# Patient Record
Sex: FEMALE | Race: WHITE
[De-identification: names, ages, dates, MRNs, and addresses within clinical notes are randomized per-mention and may not be internally consistent; named-entity substitution may affect disease eponyms.]

---

## 2020-06-12 ENCOUNTER — HOSPITAL ENCOUNTER (OUTPATIENT)
Dept: HOSPITAL 53 - M LAB | Age: 65
End: 2020-06-12
Attending: ORTHOPAEDIC SURGERY
Payer: COMMERCIAL

## 2020-06-12 ENCOUNTER — HOSPITAL ENCOUNTER (OUTPATIENT)
Dept: HOSPITAL 53 - M RAD | Age: 65
End: 2020-06-12
Attending: CHIROPRACTOR
Payer: COMMERCIAL

## 2020-06-12 DIAGNOSIS — M54.2: Primary | ICD-10-CM

## 2020-06-12 DIAGNOSIS — M17.12: Primary | ICD-10-CM

## 2020-06-12 LAB
ALBUMIN SERPL BCG-MCNC: 3.9 GM/DL (ref 3.2–5.2)
ALT SERPL W P-5'-P-CCNC: 25 U/L (ref 12–78)
BILIRUB SERPL-MCNC: 0.5 MG/DL (ref 0.2–1)
BUN SERPL-MCNC: 20 MG/DL (ref 7–18)
CALCIUM SERPL-MCNC: 9 MG/DL (ref 8.8–10.2)
CHLORIDE SERPL-SCNC: 104 MEQ/L (ref 98–107)
CO2 SERPL-SCNC: 29 MEQ/L (ref 21–32)
CREAT SERPL-MCNC: 1.08 MG/DL (ref 0.55–1.3)
ERYTHROCYTE [SEDIMENTATION RATE] IN BLOOD BY WESTERGREN METHOD: 37 MM/HR (ref 0–30)
GFR SERPL CREATININE-BSD FRML MDRD: 53.9 ML/MIN/{1.73_M2} (ref 45–?)
GLUCOSE SERPL-MCNC: 100 MG/DL (ref 70–100)
HCT VFR BLD AUTO: 40 % (ref 36–47)
HGB BLD-MCNC: 13 G/DL (ref 12–15.5)
INR PPP: 1.09
MCH RBC QN AUTO: 30.4 PG (ref 27–33)
MCHC RBC AUTO-ENTMCNC: 32.5 G/DL (ref 32–36.5)
MCV RBC AUTO: 93.5 FL (ref 80–96)
PLATELET # BLD AUTO: 390 10^3/UL (ref 150–450)
POTASSIUM SERPL-SCNC: 3.7 MEQ/L (ref 3.5–5.1)
PROT SERPL-MCNC: 7.5 GM/DL (ref 6.4–8.2)
PROTHROMBIN TIME: 13.8 SECONDS (ref 11.8–14)
RBC # BLD AUTO: 4.28 10^6/UL (ref 4–5.4)
SODIUM SERPL-SCNC: 139 MEQ/L (ref 136–145)
WBC # BLD AUTO: 6.3 10^3/UL (ref 4–10)

## 2020-06-12 NOTE — ECGEPIP
[facility]

                                       

                                       Test Date:    2020

Pat Name:     ILAN WALDEN            Department:   

Patient ID:   [id number]                 Room:         -

Gender:       Female                   Technician:   BEN

:          1955               Requested By: LISA Skinner

Order Number: [id number]     Reading MD:   Demetrius Negron

                                 Measurements

Intervals                              Axis          

Rate:         75                       P:            54

TX:           167                      QRS:          -16

QRSD:         88                       T:            48

QT:           410                                    

QTc:          460                                    

                           Interpretive Statements

SINUS RHYTHM

Low QRS complex voltage in the limb leads

POSSIBLE INFERIOR MYOCARDIAL INFARCTION, PROBABLY OLD

Nonspecific ST-T wave abnormalities

Comparison tracing not on file

Electronically Signed on 2020 16:18:16 EDT by Demetrius Negron

## 2020-06-12 NOTE — REP
TWO-VIEW CHEST:

 

REASON FOR EXAM:  Preop.

 

COMPARISON:  Portable exam of 08/18/2014, which is the only prior.

 

FINDINGS:  The superior mediastinal structures are midline.  The cardiac

silhouette is unremarkable in size, shape, and position.  The diaphragmatic

surfaces of the lungs are regular, and the costophrenic angles are clear.  The

pulmonary fields are clear.  The imaged osseous structures are intact.

 

IMPRESSION:

 

There is no acute cardiopulmonary disease. No significant change from the prior

exam.

 

 

Electronically Signed by

Daniele Vanegas DO 06/12/2020 03:05 P

## 2020-06-12 NOTE — HPE
DATE OF ANTICIPATED ADMISSION:  06/17/2020

 

ATTENDING PHYSICIAN:  Dr. Mcginnis

 

CHIEF COMPLAINT:  Left knee pain and stiffness.

 

HISTORY:

The patient is a 65-year-old female with progressively worsening left knee pain

and stiffness.  She has failed to improve with conservative measures.  She

continues to have symptoms with weightbearing activities and activities of daily

living.  She consented for an elective left total knee arthroplasty with Dr. Mcginnis for her continued symptoms.  Medical optimization is pending with 
her

primary care manager Dr. Quintana.

 

CURRENT MEDICATIONS:

- aspirin 81 mg daily

- potassium 20 mEq daily

- magnesium 250 mg daily

- cinnamon 2000 mg twice daily

- Prilosec 20 mg twice daily

- pravastatin 40 mg daily

- venlafaxine 75 mg twice daily

- hydrochlorothiazide 50 mg daily

- amlodipine 5 mg daily

- lisinopril 5 mg daily

- meloxicam 15 mg daily

- acyclovir 400 mg twice daily

 

ALLERGIES:

TALWIN, STADOL, CLINDAMYCIN, LEVAQUIN, CODEINE, TAVIST, MINOCIN, DEMEROL.

 

CHRONIC MEDICAL CONSITIONS:

Gastroesophageal reflux disease.

Hypertension.

Hyperlipidemia.

Osteoarthritis.

Post menopausal symptoms.

 

PAST SURGICAL HISTORY:

Total hysterectomy.

Cholecystectomy.

Cystocele repair.

Urethra sling.

Bilateral tubal ligation,

 

SOCIAL HISTORY:

The patient denies smoking and rarely consumes alcohol.

 

REVIEW OF SYSTEMS:

The patient denies fevers, chills, nausea, vomiting, or diarrhea.  She denies

chest pain, shortness of breath, lightheadedness, dizziness, or headaches.  She

denies any recent upper respiratory or urinary tract infection symptoms.  She

denies any abdominal pain.  She does continue to have left knee pain with

weightbearing activities and activities of daily living.

 

PHYSICAL EXAMINATION:

GENERAL:  Well-nourished, well-developed female in no apparent distress.  She is

alert, oriented and cooperative.  Mood and affect are appropriate.

VITAL SIGNS:  Blood pressure 132/68.  Heart rate 63.  Respirations 14.  Height 
64

inches.  Weight 176.  Temperature 96.8.

HEART:  Regular rate and rhythm.

LUNGS:  Breathing is regular and nonlabored.  Lungs are clear to auscultation

bilaterally.

ABDOMEN:  Soft.  Bowel sounds are present.  No tenderness to palpation.

MUSCULOSKELETAL:  Left knee reveals no gross abnormalities.  There is no

erythema, edema, or ecchymosis.  Skin is intact.  She does have tenderness along

the medial joint line.  The patient can extend knee to 2 degrees and flex to 110

degrees.  Left lower extremity strength is 5/5.  No hip irritability elicited

with range of motion testing.  The patient's calf is soft, nontender to 
palpation

with no palpable cords noted.  She is neurovascularly intact distally.

 

IMPRESSION:

Left knee degenerative arthritis with x-rays notable for end-stage degenerative

changes.

 

PLAN:

The patient has consented for a left total knee arthroplasty with Dr. Mcginnis

for her continued symptoms.  Medical optimization pending with Dr. Quintana.  The

patient will  her Hibiclens wash and Bactroban and start using those

today.  She will discontinue her meloxicam 3 days prior to surgery.

MTDD

## 2020-06-12 NOTE — REP
REASON:  Severe neck pain.

 

There are no priors for comparison.

 

I have been given no history of trauma whatsoever.

 

There is advanced disc space narrowing at every level, particularly C4-5, C5-6,

and C6-7, where heavy anterior and posterior osteophytic ridging is present.

There is limitation of flexion and extension radiographically. Hypertrophic

degenerative facet and uncovertebral joint changes are present at every level

bilaterally. The facet joints appear to be well aligned bilaterally.

 

On the left, there is foraminal narrowing C4-5 through C6-7 inclusive, and on the

right there is foraminal narrowing C3-4 through C7-T1 inclusive.

 

The dens cannot be effectively evaluated secondary to the superimposition of

osseous structures and/or dentition on all views.

 

Marked degenerative changes, as described above. Consider further evaluation with

MRI.

 

Although this plain radiographic evaluation of the cervical spine shows no

evidence of a fracture, it should be remembered that CT is much more sensitive

than plain radiography of the C-spine in detecting fractures.  If this

examination was ordered to rule out a fracture, then CT of the cervical spine is

recommended.

 

 

Electronically Signed by

Daniele Vanegas DO 06/12/2020 03:06 P

## 2020-06-14 ENCOUNTER — HOSPITAL ENCOUNTER (OUTPATIENT)
Dept: HOSPITAL 53 - M LABSMTC | Age: 65
End: 2020-06-14
Attending: ANESTHESIOLOGY
Payer: COMMERCIAL

## 2020-06-14 DIAGNOSIS — Z03.818: Primary | ICD-10-CM

## 2020-06-14 DIAGNOSIS — Z11.59: ICD-10-CM

## 2020-06-17 ENCOUNTER — HOSPITAL ENCOUNTER (INPATIENT)
Dept: HOSPITAL 53 - M OR | Age: 65
LOS: 1 days | Discharge: HOME | DRG: 302 | End: 2020-06-18
Attending: ORTHOPAEDIC SURGERY | Admitting: ORTHOPAEDIC SURGERY
Payer: COMMERCIAL

## 2020-06-17 VITALS — SYSTOLIC BLOOD PRESSURE: 126 MMHG | DIASTOLIC BLOOD PRESSURE: 72 MMHG

## 2020-06-17 VITALS — DIASTOLIC BLOOD PRESSURE: 69 MMHG | SYSTOLIC BLOOD PRESSURE: 123 MMHG

## 2020-06-17 VITALS — BODY MASS INDEX: 30.65 KG/M2 | HEIGHT: 63 IN | WEIGHT: 173 LBS

## 2020-06-17 VITALS — SYSTOLIC BLOOD PRESSURE: 119 MMHG | DIASTOLIC BLOOD PRESSURE: 54 MMHG

## 2020-06-17 VITALS — SYSTOLIC BLOOD PRESSURE: 93 MMHG | DIASTOLIC BLOOD PRESSURE: 52 MMHG

## 2020-06-17 VITALS — SYSTOLIC BLOOD PRESSURE: 119 MMHG | DIASTOLIC BLOOD PRESSURE: 68 MMHG

## 2020-06-17 VITALS — SYSTOLIC BLOOD PRESSURE: 123 MMHG | DIASTOLIC BLOOD PRESSURE: 67 MMHG

## 2020-06-17 DIAGNOSIS — Z88.5: ICD-10-CM

## 2020-06-17 DIAGNOSIS — K21.9: ICD-10-CM

## 2020-06-17 DIAGNOSIS — I10: ICD-10-CM

## 2020-06-17 DIAGNOSIS — Z79.82: ICD-10-CM

## 2020-06-17 DIAGNOSIS — Z79.1: ICD-10-CM

## 2020-06-17 DIAGNOSIS — Z79.899: ICD-10-CM

## 2020-06-17 DIAGNOSIS — M17.12: Primary | ICD-10-CM

## 2020-06-17 DIAGNOSIS — Z88.8: ICD-10-CM

## 2020-06-17 DIAGNOSIS — Z90.710: ICD-10-CM

## 2020-06-17 DIAGNOSIS — Z90.49: ICD-10-CM

## 2020-06-17 DIAGNOSIS — Z88.1: ICD-10-CM

## 2020-06-17 DIAGNOSIS — E78.5: ICD-10-CM

## 2020-06-17 PROCEDURE — 0SRD0J9 REPLACEMENT OF LEFT KNEE JOINT WITH SYNTHETIC SUBSTITUTE, CEMENTED, OPEN APPROACH: ICD-10-PCS | Performed by: ORTHOPAEDIC SURGERY

## 2020-06-17 RX ADMIN — HYDROMORPHONE HYDROCHLORIDE PRN MG: 1 INJECTION, SOLUTION INTRAMUSCULAR; INTRAVENOUS; SUBCUTANEOUS at 16:36

## 2020-06-17 RX ADMIN — ASPIRIN 81 MG CHEWABLE TABLET SCH MG: 81 TABLET CHEWABLE at 21:13

## 2020-06-17 RX ADMIN — CEFAZOLIN SODIUM SCH MLS/HR: 2 SOLUTION INTRAVENOUS at 21:14

## 2020-06-17 RX ADMIN — HYDROMORPHONE HYDROCHLORIDE PRN MG: 1 INJECTION, SOLUTION INTRAMUSCULAR; INTRAVENOUS; SUBCUTANEOUS at 16:48

## 2020-06-17 RX ADMIN — SODIUM CHLORIDE, POTASSIUM CHLORIDE, SODIUM LACTATE AND CALCIUM CHLORIDE SCH MLS/HR: 600; 310; 30; 20 INJECTION, SOLUTION INTRAVENOUS at 18:39

## 2020-06-17 RX ADMIN — HYDROMORPHONE HYDROCHLORIDE PRN MG: 1 INJECTION, SOLUTION INTRAMUSCULAR; INTRAVENOUS; SUBCUTANEOUS at 16:54

## 2020-06-17 NOTE — REP
LEFT KNEE SERIES:  TWO VIEWS.

 

HISTORY:  Postop evaluation.

 

FINDINGS:  Portably obtained AP and lateral views of the left knee demonstrate

left knee arthroplasty components in good position.  Anterior skin staples are

seen.  Periarticular and intra-articular soft tissue emphysema is noted.

 

IMPRESSION:

 

Patient status post left knee arthroplasty.

 

 

Electronically Signed by

Saravanan Schwartz MD 06/17/2020 05:23 P

## 2020-06-17 NOTE — CR.PDOC
General


Date of Consultation:  Jun 17, 2020





Consultation


REASON FOR CONSULTATION/CHIEF COMPLAINT: 


Medical management


Who presented to the hospital for an elective orthopedic procedure





HISTORY OF PRESENT ILLNESS:


Patient is a 65-year-old  female with a past medical history of 

hypertension, dyslipidemia, osteoarthritis, and GERD who presented to the [facility] 

for an elective orthopedic procedure.





Patient was scheduled for a total left knee arthroplasty and has received 

medical clearance from her outpatient provider, Dr. Quintana. Patient reports that

she received a stress test the day prior, which was negative. . Hospitalist 

service was consulted for medical management.





Patient was seen preoperatively. She reports a mild headache. Denies nausea, 

vomiting, chest pain, shortness of breath, cough, palpitations, abdominal pain, 

constipation, diarrhea, urinary discomfort, fevers or chills.





Patient reports her appetite is fairly normal. He denies any changes in her 

weight.





ALLERGIES: 


Please see below.





HOME MEDICATIONS: 


Please see below.





PAST MEDICAL HISTORY:


Hypertension, dyslipidemia, osteoarthritis, and GERD





PAST SURGICAL HISTORY: 


Hysterectomy


Cholecystectomy


Cystocele repair


Urethral sling


Bilateral tubal ligation





FAMILY HISTORY:


- No history of malignancies 





SOCIAL HISTORY:


- Denies the use of alcohol, tobacco or illicit drugs


- Lives with 





REVIEW OF SYSTEMS:


10 point review of systems complete, all negative otherwise stated in HPI





PHYSICAL EXAMINATION:


- Vitals: /63, HR 95, RR 20, Sat 97%RA, Temp 97.9F


- General: Lying in bed, No acute distress, Speaking in full sentences, AAOx3


- HEENT: NC, AT, PERRLA


- CVS: RRR, +S1S2


- Lungs: Fair air entry bilaterally, no appreciable wheezing / rales / rhonchi 


- Abdomen: Soft, Non-distended, Non-tender


- Extremities: No lower extremity edema, No calf tenderness


- Neuro: No focal motor or sensory deficit


- Skin: No visible rashes  





LABORATORY DATA: Please see below.





ASSESSMENT/PLAN: 


Elective total left knee arthroplasty


- Patient has presented to [facility] for elective procedure with 

orthopedic surgery


- She has received outpatient medical clearance from her primary care provider, 

Dr. Quintana


- Pain control, anticoagulation and physical therapy at the direction of 

orthopedic surgery





Hypertension


- Will hold HCTZ (re: Await AM lab work)


- c/w Lisinopril / Amlodipine





Dyslipidemia


- c/w Pravastatin 





Osteoarthritis


- c/w Tylenol and additional pain control as per orthopedic team 





Depression


- c/w Venlafaxine 





GERD


- c/w Omeprazole 





DVT prophylaxis 


- Anticoagulation as per orthopedic team





Vital Signs/I&O





Vital Signs








  Date Time  Temp Pulse Resp B/P (MAP) Pulse Ox O2 Delivery O2 Flow Rate FiO2


 


6/17/20 11:01 97.9 95 20 124/63 (83) 97 Room Air  











Allergies


Coded Allergies:  


     clindamycin (Verified  Allergy, Intermediate, hives, 6/12/20)


     levofloxacin (Verified  Allergy, Intermediate, hives, 6/12/20)


     minocycline (Verified  Allergy, Intermediate, hives, 6/12/20)


     butorphanol (Verified  Adverse Reaction, Intermediate, nausea, 6/12/20)


     codeine (Verified  Adverse Reaction, Intermediate, nausea, 6/12/20)


     meperidine (Verified  Adverse Reaction, Intermediate, nausea, 6/12/20)


Uncoded Allergies:  


     PAVIST (Allergy, Intermediate, itching, 6/12/20)





Home Medications


Scheduled


Acyclovir (Acyclovir) 400 Mg Tablet, 400 MG PO BID, (Reported)


Amlodipine Besylate (Norvasc) 5 Mg Tablet, 5 MG PO DAILY, (Reported)


Aspirin (Aspir 81) 81 Mg Tablet.dr, 81 MG PO DAILY, #30 (Reported)


Cinnamon Bark (Cinnamon) 500 Mg Capsule, 2,000 MG PO BID, (Reported)


Hydrochlorothiazide (Hydrochlorothiazide) 50 Mg Tablet, 50 MG PO DAILY, 

(Reported)


Lisinopril (Lisinopril) 5 Mg Tablet, 5 MG PO DAILY, (Reported)


Meloxicam (Meloxicam) 15 Mg Tablet, 15 MG PO DAILY, (Reported)


Omeprazole Magnesium (Prilosec Otc) 20 Mg Tablet.dr, 20 MG PO BID, (Reported)


Pravastatin Sodium (Pravastatin Sodium) 40 Mg Tablet, 40 MG PO DAILY, (Reported)


Venlafaxine HCl (Venlafaxine HCl) 75 Mg Tablet, 75 MG PO BID, (Reported)











ZAY ROSE MD                Jun 17, 2020 12:32

## 2020-06-18 VITALS — SYSTOLIC BLOOD PRESSURE: 101 MMHG | DIASTOLIC BLOOD PRESSURE: 59 MMHG

## 2020-06-18 VITALS — SYSTOLIC BLOOD PRESSURE: 106 MMHG | DIASTOLIC BLOOD PRESSURE: 63 MMHG

## 2020-06-18 LAB
ALBUMIN SERPL BCG-MCNC: 3.1 GM/DL (ref 3.2–5.2)
ALT SERPL W P-5'-P-CCNC: 20 U/L (ref 12–78)
BASOPHILS # BLD AUTO: 0 10^3/UL (ref 0–0.2)
BASOPHILS NFR BLD AUTO: 0.1 % (ref 0–1)
BILIRUB SERPL-MCNC: 0.3 MG/DL (ref 0.2–1)
BUN SERPL-MCNC: 18 MG/DL (ref 7–18)
CALCIUM SERPL-MCNC: 8.4 MG/DL (ref 8.8–10.2)
CHLORIDE SERPL-SCNC: 103 MEQ/L (ref 98–107)
CO2 SERPL-SCNC: 28 MEQ/L (ref 21–32)
CREAT SERPL-MCNC: 1.19 MG/DL (ref 0.55–1.3)
EOSINOPHIL # BLD AUTO: 0 10^3/UL (ref 0–0.5)
EOSINOPHIL NFR BLD AUTO: 0 % (ref 0–3)
GFR SERPL CREATININE-BSD FRML MDRD: 48.2 ML/MIN/{1.73_M2} (ref 45–?)
GLUCOSE SERPL-MCNC: 158 MG/DL (ref 70–100)
HCT VFR BLD AUTO: 32.9 % (ref 36–47)
HGB BLD-MCNC: 11 G/DL (ref 12–15.5)
LYMPHOCYTES # BLD AUTO: 0.6 10^3/UL (ref 1.5–5)
LYMPHOCYTES NFR BLD AUTO: 4.7 % (ref 24–44)
MAGNESIUM SERPL-MCNC: 1.9 MG/DL (ref 1.8–2.4)
MCH RBC QN AUTO: 31.3 PG (ref 27–33)
MCHC RBC AUTO-ENTMCNC: 33.4 G/DL (ref 32–36.5)
MCV RBC AUTO: 93.5 FL (ref 80–96)
MONOCYTES # BLD AUTO: 0.9 10^3/UL (ref 0–0.8)
MONOCYTES NFR BLD AUTO: 6.7 % (ref 0–5)
NEUTROPHILS # BLD AUTO: 11.4 10^3/UL (ref 1.5–8.5)
NEUTROPHILS NFR BLD AUTO: 88.2 % (ref 36–66)
PLATELET # BLD AUTO: 334 10^3/UL (ref 150–450)
POTASSIUM SERPL-SCNC: 3.7 MEQ/L (ref 3.5–5.1)
PROT SERPL-MCNC: 6.2 GM/DL (ref 6.4–8.2)
RBC # BLD AUTO: 3.52 10^6/UL (ref 4–5.4)
SODIUM SERPL-SCNC: 140 MEQ/L (ref 136–145)
WBC # BLD AUTO: 12.9 10^3/UL (ref 4–10)

## 2020-06-18 RX ADMIN — CEFAZOLIN SODIUM SCH MLS/HR: 2 SOLUTION INTRAVENOUS at 14:16

## 2020-06-18 RX ADMIN — CEFAZOLIN SODIUM SCH MLS/HR: 2 SOLUTION INTRAVENOUS at 06:26

## 2020-06-18 RX ADMIN — ASPIRIN 81 MG CHEWABLE TABLET SCH MG: 81 TABLET CHEWABLE at 09:53

## 2020-06-18 RX ADMIN — SODIUM CHLORIDE, POTASSIUM CHLORIDE, SODIUM LACTATE AND CALCIUM CHLORIDE SCH MLS/HR: 600; 310; 30; 20 INJECTION, SOLUTION INTRAVENOUS at 01:39

## 2020-06-18 RX ADMIN — ACETAMINOPHEN PRN MG: 325 TABLET ORAL at 14:15

## 2020-06-18 RX ADMIN — ACETAMINOPHEN PRN MG: 325 TABLET ORAL at 09:53

## 2020-06-18 NOTE — IPNPDOC
Text Note


Date of Service


The patient was seen on 6/18/20.





NOTE


Subjective: No complaints this am except for appropriate knee pain. No nausea or

vomiting, no chest pain or SOB. 





PHYSICAL EXAMINATION:


Vitals: as below


General: Lying in bed, No acute distress, Speaking in full sentences, AAOx3


HEENT: NC, AT, PERRLA


CVS: RRR, +S1S2


Lungs: Fair air entry bilaterally, no appreciable wheezing / rales / rhonchi 


Abdomen: Soft, Non-distended, Non-tender


Extremities: No lower extremity edema, No calf tenderness


Neuro: No focal motor or sensory deficit


Skin: No visible rashes  





ASSESSMENT/PLAN: Patient is a 65-year-old  female with a past medical 

history of hypertension, dyslipidemia, osteoarthritis, and GERD who presented to

the [facility] for an elective orthopedic procedure. Patient had total left knee 

arthroplasty  on 6/17/20





Elective total left knee arthroplasty


Pain control, anticoagulation and physical therapy at the direction of 

orthopedic surgery





Hypertension


c/w Lisinopril / Amlodipine


can restart HCTZ on discharge. 





Dyslipidemia


c/w Pravastatin 





Depression


c/w Venlafaxine 





GERD


c/w Omeprazole 





DVT prophylaxis 


Anticoagulation as per orthopedic team





VS,Ade, I+O


VSAde, I+O


Laboratory Tests


6/18/20 06:14











Vital Signs








  Date Time  Temp Pulse Resp B/P (MAP) Pulse Ox O2 Delivery O2 Flow Rate FiO2


 


6/18/20 10:24   18   Room Air  


 


6/18/20 02:06 98.3 89  101/59 (73) 91   


 


6/17/20 21:00       2.0 














I&O- Last 24 Hours up to 6 AM 


 


 6/18/20





 06:00


 


Intake Total 2670 ml


 


Output Total 970 ml


 


Balance 1700 ml

















PIERCE RENE MD                   Jun 18, 2020 12:20

## 2020-06-18 NOTE — RO
DATE OF PROCEDURE:  06/17/2020

 

PREPROCEDURE DIAGNOSIS:  Left knee degenerative arthritis.

 

POSTPROCEDURE DIAGNOSIS:  Left knee degenerative arthritis.

 

PROCEDURE:  Left total knee arthroplasty using a size 5 cruciate retaining

femoral component with a size 5 tibial tray and a 6 mm rotating plate form

polyethylene insert and a 32 mm polyethylene button.  All components were

cemented.  Prosthesis was made by [employer].  It was an [employer].

 

SURGEON:   Dr. LIAS Mcginnis.

 

ASSISTANT:  KEO Berumen.

 

ANESTHESIA:  Spinal with left femoral nerve block.

 

COMPLICATIONS:  None.

 

SPECIMENS:  Joint surface.

 

ESTIMATED BLOOD LOSS: Less than 20 mL.

 

DESCRIPTION OF PROCEDURE:   Antibiotics were given intravenously preoperatively,

then a successful left femoral nerve block and then spinal anesthetic was

induced.  Tourniquet placed on the left upper thigh and not inflated.  Left lower

extremity was carefully prepped and draped in the usual sterile fashion and the

leg elevated.

 

After an appropriate time-out, the tourniquet was inflated and the longitudinal

incision was made for a medial parapatellar approach to the knee.  Bovie cautery

was used to coagulate crossing vessels. Medial parapatellar arthrotomy performed.

Subperiosteal dissection of a possible medial and lateral tibial plateau was

performed and the patella was everted.  The knees flexed.  The anterior cruciate

ligament (ACL) debrided.  Drill placed down the center of the femoral canal,

probed with an intramedullary ming.  The distal femoral cutting jig set at 9  mm

resection level at 5 degree valgus for a left knee.  Block was pinned into

position.  Distal femoral cut performed.  AP sizing jig measured for a size 5.  3

degrees of external rotation were dialed in.  The 4-in-1 block applied and the

anterior and posterior chamfer cuts performed.  Sulcus osteotomy jig was applied

and the sulcus osteotomy performed.  We then exposed the proximal tibia, sized

and used the extramedullary alignment jig to estimated being parallel to the

mechanical axis with the tibial referencing off the medial tibial condyle at 4 mm

resection levels.  The block was pinned into position. Secondary extramedullary

ming confirmed we appeared to be parallel.  Then we performed the proximal tibial

osteotomy.   Lamina  ws then placed laterally and we performed a

completion medial meniscectomy debriding posteromedial osteophytes.  We then

placed the lamina  medially and performed a completion lateral

meniscectomy debriding the posterolateral osteophytes.  Spacer block at 6 mm

actually fit very nicely and with good symmetry in the flexion and extension

joint space with good stability varus valgus stress testing.

 

We the exposed the proximal tibia, sized for a #5 tray which was pinned into

position followed by the reamer and broach.  Then the trial polyethylene placed.

Trial femoral component was placed.  Brought the knee into extension, everted the

patella, performed a patellar osteotomy and sized for a 32 button.  Lung holes

drilled, trial placed, patellofemoral tracking was anatomic.  She had very good

stability and thus I drilled the lung holes for the femur.  I removed all the of

trial components.  Exparel was placed in the subperiosteal tissues along the

distal femur and the proximal tibia.  Then my assistant, Cristal Bryant mixed the

cement on the back of the table as I prepared the bony surfaces for cementing

with a copious amount of pulsatile lavage irrigant solution.  She was critical to

the success of this difficult surgery by helping with preparing the patient,

helping to irrigate and helping to manipulate the knee and apply appropriate soft

tissue retraction so I can perform the operation smoothly, efficiently and

safely.  Once the cement was mixed and the bony surfaced thoroughly irrigated, I

cemented the tibial tray, removed excess cement, placed the polyethylene.  Then

cemented the femoral component, removed the excess cement. Brought the knee into

extension and cemented the patellar button and held it with a clamp with the knee

in full extension until the cement hardened.

 

As we were waiting for this, we copiously pulsatile lavaged and irrigated out the

knee joint.  Then placed Exparel and then closed the arthrotomy with two apex #1

PDS sutures, one at the medial parapatellar area and then a double-arm #1

Stratafix to close the capsule in a running fashion.  Then we released the

tourniquet.  We irrigated between layers.  Closed the deep subdermal tissues with

interrupted #2-0 PDS sutures. The skin was closed with staples, covered by an

Optifoam with a dry sterile bulky dressing.  She was then transferred to the

recovery room in stable condition.  There were no intraoperative complications.